# Patient Record
Sex: MALE | Employment: FULL TIME | URBAN - METROPOLITAN AREA
[De-identification: names, ages, dates, MRNs, and addresses within clinical notes are randomized per-mention and may not be internally consistent; named-entity substitution may affect disease eponyms.]

---

## 2020-03-03 ENCOUNTER — OFFICE VISIT (OUTPATIENT)
Dept: URGENT CARE | Facility: CLINIC | Age: 30
End: 2020-03-03
Payer: COMMERCIAL

## 2020-03-03 VITALS
DIASTOLIC BLOOD PRESSURE: 70 MMHG | RESPIRATION RATE: 14 BRPM | HEIGHT: 73 IN | OXYGEN SATURATION: 98 % | HEART RATE: 86 BPM | SYSTOLIC BLOOD PRESSURE: 132 MMHG | BODY MASS INDEX: 24.43 KG/M2 | TEMPERATURE: 98.7 F | WEIGHT: 184.3 LBS

## 2020-03-03 DIAGNOSIS — R05.9 COUGH: ICD-10-CM

## 2020-03-03 DIAGNOSIS — J98.01 BRONCHOSPASM: ICD-10-CM

## 2020-03-03 DIAGNOSIS — J11.1 INFLUENZA-LIKE ILLNESS: ICD-10-CM

## 2020-03-03 PROCEDURE — 99204 OFFICE O/P NEW MOD 45 MIN: CPT | Performed by: PHYSICIAN ASSISTANT

## 2020-03-03 RX ORDER — METHYLPREDNISOLONE 4 MG/1
TABLET ORAL
Qty: 21 TAB | Refills: 0 | Status: SHIPPED | OUTPATIENT
Start: 2020-03-03

## 2020-03-03 RX ORDER — BENZONATATE 100 MG/1
100 CAPSULE ORAL 3 TIMES DAILY PRN
Qty: 60 CAP | Refills: 0 | Status: SHIPPED | OUTPATIENT
Start: 2020-03-03

## 2020-03-03 SDOH — HEALTH STABILITY: MENTAL HEALTH: HOW OFTEN DO YOU HAVE A DRINK CONTAINING ALCOHOL?: NEVER

## 2020-03-03 ASSESSMENT — ENCOUNTER SYMPTOMS
DIARRHEA: 0
WHEEZING: 0
CHILLS: 0
NAUSEA: 0
SHORTNESS OF BREATH: 0
COUGH: 1
MYALGIAS: 1
SPUTUM PRODUCTION: 0
FEVER: 0
VOMITING: 0
ABDOMINAL PAIN: 0
SORE THROAT: 1

## 2020-03-03 NOTE — PROGRESS NOTES
Subjective:     Deion Lubin  is a 29 y.o. male who presents for Cough (x 4 days / congestion / )       Patient comes clinic complaining last 4 days of upper respiratory infection symptoms.  Notes initially with fevers and chills he appreciated subjectively.  Notes some waning of fever symptoms.  Notes evening time body aches.  Complains of coughing denies wheeze but notes slightly spastic without much production.  Complains of irritation to throat and ear fullness bilaterally.  Denies nausea vomiting abdominal pain diarrhea or rash.  Does admit to some near posttussive emesis.  Denies history of asthma or bronchitis.  Notes childhood pneumonia.  Patient states he never gets sick and this feels similar to when he had pneumonia as a child.  Of note patient's in-laws were visiting last week he was skiing with them later in the week, they traveled home on Saturday and were diagnosed with influenza A on Sunday.  Patient's onset of symptoms was also on Sunday.  Denies flu shot.  Has been treating symptoms with DayQuil and NyQuil.  History reviewed. No pertinent past medical history.History reviewed. No pertinent surgical history.  Social History     Socioeconomic History   • Marital status: Not on file     Spouse name: Not on file   • Number of children: Not on file   • Years of education: Not on file   • Highest education level: Not on file   Occupational History   • Not on file   Social Needs   • Financial resource strain: Not on file   • Food insecurity     Worry: Not on file     Inability: Not on file   • Transportation needs     Medical: Not on file     Non-medical: Not on file   Tobacco Use   • Smoking status: Never Smoker   • Smokeless tobacco: Never Used   Substance and Sexual Activity   • Alcohol use: Not Currently     Frequency: Never   • Drug use: Never   • Sexual activity: Yes   Lifestyle   • Physical activity     Days per week: Not on file     Minutes per session: Not on file   • Stress: Not on file  "  Relationships   • Social connections     Talks on phone: Not on file     Gets together: Not on file     Attends Faith service: Not on file     Active member of club or organization: Not on file     Attends meetings of clubs or organizations: Not on file     Relationship status: Not on file   • Intimate partner violence     Fear of current or ex partner: Not on file     Emotionally abused: Not on file     Physically abused: Not on file     Forced sexual activity: Not on file   Other Topics Concern   • Not on file   Social History Narrative   • Not on file    History reviewed. No pertinent family history. Review of Systems   Constitutional: Negative for chills and fever ( not for over 24hrs).   HENT: Positive for congestion ( sinus and chest) and sore throat ( 2/2 cough). Negative for ear pain.    Respiratory: Positive for cough. Negative for sputum production, shortness of breath and wheezing.    Gastrointestinal: Negative for abdominal pain, diarrhea, nausea and vomiting.   Musculoskeletal: Positive for myalgias (improving).   Skin: Negative for rash.   No Known Allergies   I have worn a mask for the entire encounter with this patient.    Objective:   /70   Pulse 86   Temp 37.1 °C (98.7 °F) (Temporal)   Resp 14   Ht 1.854 m (6' 1\")   Wt 83.6 kg (184 lb 4.9 oz)   SpO2 98%   BMI 24.32 kg/m²   Physical Exam  Vitals signs and nursing note reviewed.   Constitutional:       General: He is not in acute distress.     Appearance: He is well-developed. He is not toxic-appearing or diaphoretic.   HENT:      Head: Normocephalic and atraumatic.      Right Ear: Tympanic membrane, ear canal and external ear normal.      Left Ear: Tympanic membrane, ear canal and external ear normal.      Nose: Nose normal.      Mouth/Throat:      Lips: Pink.      Mouth: Mucous membranes are moist.      Pharynx: Uvula midline. Posterior oropharyngeal erythema ( mild PND) present. No oropharyngeal exudate.      Tonsils: No " tonsillar exudate or tonsillar abscesses.   Eyes:      General: No scleral icterus.        Right eye: No discharge.         Left eye: No discharge.      Conjunctiva/sclera: Conjunctivae normal.   Neck:      Musculoskeletal: Neck supple.   Pulmonary:      Effort: Pulmonary effort is normal. No accessory muscle usage or respiratory distress.      Breath sounds: No stridor. Wheezing (mild) present. No decreased breath sounds, rhonchi ( clears w/ cough) or rales.   Musculoskeletal: Normal range of motion.   Lymphadenopathy:      Cervical: Cervical adenopathy ( mild bilat) present.   Skin:     General: Skin is warm and dry.      Coloration: Skin is not pale.   Neurological:      Mental Status: He is alert and oriented to person, place, and time.      Coordination: Coordination normal.           Assessment/Plan:   Assessment    1. Influenza-like illness    2. Cough  - benzonatate (TESSALON) 100 MG Cap; Take 1 Cap by mouth 3 times a day as needed for Cough.  Dispense: 60 Cap; Refill: 0    3. Bronchospasm  - methylPREDNISolone (MEDROL DOSEPAK) 4 MG Tablet Therapy Pack; Follow schedule on package instructions.  Dispense: 21 Tab; Refill: 0  Supportive care is reviewed with patient/caregiver - recommend to push PO fluids and electrolytes, we discussed with likely exposure to influenza and similar onset of symptoms from this a likely diagnosis, outside of timeframe of Tamiflu expected to help, Medrol dose pack now for bronchospastic coughing, Tessalon for while at work, discussed typical timeframe/duration of expected illness red flag symptoms to return to clinic versus ER  Return to clinic with lack of resolution or progression of symptoms.  ER precautions with any worsening symptoms are reviewed with patient/caregiver and they do express understanding  Cautioned regarding potential side effects of steroid, avoid nsaids while using    Differential diagnosis, natural history, supportive care, and indications for immediate  follow-up discussed.

## 2020-12-04 DIAGNOSIS — J02.9 SORE THROAT: ICD-10-CM

## 2020-12-17 ENCOUNTER — HOSPITAL ENCOUNTER (OUTPATIENT)
Dept: LAB | Facility: MEDICAL CENTER | Age: 30
End: 2020-12-17
Attending: PEDIATRICS
Payer: COMMERCIAL

## 2020-12-17 LAB
COVID ORDER STATUS COVID19: NORMAL
SARS-COV-2 RNA RESP QL NAA+PROBE: NOTDETECTED
SPECIMEN SOURCE: NORMAL

## 2020-12-17 PROCEDURE — U0003 INFECTIOUS AGENT DETECTION BY NUCLEIC ACID (DNA OR RNA); SEVERE ACUTE RESPIRATORY SYNDROME CORONAVIRUS 2 (SARS-COV-2) (CORONAVIRUS DISEASE [COVID-19]), AMPLIFIED PROBE TECHNIQUE, MAKING USE OF HIGH THROUGHPUT TECHNOLOGIES AS DESCRIBED BY CMS-2020-01-R: HCPCS

## 2020-12-17 PROCEDURE — C9803 HOPD COVID-19 SPEC COLLECT: HCPCS
